# Patient Record
Sex: MALE | Race: WHITE | NOT HISPANIC OR LATINO | Employment: STUDENT | URBAN - METROPOLITAN AREA
[De-identification: names, ages, dates, MRNs, and addresses within clinical notes are randomized per-mention and may not be internally consistent; named-entity substitution may affect disease eponyms.]

---
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
## 2023-01-25 ENCOUNTER — OFFICE VISIT (OUTPATIENT)
Dept: FAMILY MEDICINE CLINIC | Facility: CLINIC | Age: 18
End: 2023-01-25

## 2023-01-25 VITALS
TEMPERATURE: 97.7 F | WEIGHT: 126.6 LBS | OXYGEN SATURATION: 99 % | DIASTOLIC BLOOD PRESSURE: 60 MMHG | SYSTOLIC BLOOD PRESSURE: 90 MMHG | HEIGHT: 69 IN | BODY MASS INDEX: 18.75 KG/M2 | RESPIRATION RATE: 99 BRPM | HEART RATE: 67 BPM

## 2023-01-25 DIAGNOSIS — Z23 IMMUNIZATION DUE: ICD-10-CM

## 2023-01-25 DIAGNOSIS — Z00.00 ANNUAL PHYSICAL EXAM: Primary | ICD-10-CM

## 2023-01-25 DIAGNOSIS — R63.4 WEIGHT LOSS: ICD-10-CM

## 2023-01-25 NOTE — PATIENT INSTRUCTIONS

## 2023-01-25 NOTE — PROGRESS NOTES
[address]    NAME: Vito Ramos  AGE: 21 y o  SEX: male  : 2005     DATE: 2023     Assessment and Plan:     Problem List Items Addressed This Visit    None  Visit Diagnoses     Annual physical exam    -  Primary    Vito appears well  He is to continue a healthy diet, and remain active  Weight loss        Weight stable - acutally some up from last year  Normal TSH, labs, in 10/2021  Pt has continued to eat , and stay active  Immunization due        Pt with mild recent viral URI, no fevers, and resolving -> ok to proceed with Flu Vaccine  Flu Vaccine given IM today, and tolerated well  Relevant Orders    influenza vaccine, quadrivalent, 0 5 mL, preservative-free, for adult and pediatric patients 6 mos+ (AFLURIA, FLUARIX, FLULAVAL, FLUZONE) (Completed)          Immunizations and preventive care screenings were discussed with patient today  Appropriate education was printed on patient's after visit summary  Counseling:  Dental Health: discussed importance of regular tooth brushing, flossing, and dental visits  · Exercise: the importance of regular exercise/physical activity was discussed  Recommend exercise 3-5 times per week for at least 30 minutes  Depression Screening and Follow-up Plan: Patient was screened for depression during today's encounter  They screened negative with a PHQ-2 score of 0  Return in 1 year (on 2024) for Annual physical      Chief Complaint:     Chief Complaint   Patient presents with   • Annual Exam     Patient here for annual wellness exam       History of Present Illness:     Adult Annual Physical   Patient here for a comprehensive physical exam  The patient reports no problems  Luis Brock is feeling well  He did receive his GED last year - not currently working; is considering what he wants to do with his career path    Is considering starting some college courses  Had a minor URI recently, which is resolving - reports testing at home COV-19 negative  He is vaccinated against COV-19  He presents today with his [place] Mother, Shruthi Falling  Diet and Physical Activity  · Diet/Nutrition: well balanced diet  · Exercise: walking  Depression Screening  PHQ-2/9 Depression Screening    Little interest or pleasure in doing things: 0 - not at all  Feeling down, depressed, or hopeless: 0 - not at all  PHQ-2 Score: 0  PHQ-2 Interpretation: Negative depression screen       General Health  · Sleep: sleeps well  · Hearing: normal - bilateral   · Vision: no vision problems  Wears glasses  · Dental: regular dental visits  [facility]  · History of STDs?: no      Review of Systems:     Review of Systems   Constitutional: Negative for activity change and fever  HENT: Positive for congestion  Respiratory: Negative for shortness of breath  Cardiovascular: Negative for chest pain  Gastrointestinal: Negative for abdominal pain  Psychiatric/Behavioral: Negative for dysphoric mood  The patient is not nervous/anxious         Past Medical History:     Past Medical History:   Diagnosis Date   • Asthma    • Candidiasis, cutaneous     last assessed 7/13/2015   • Cerebral palsy ([facility] [address] )    • Hematuria     last assessed 8/17/2012   • Hordeolum externum     last assessed 10/22/2012      Past Surgical History:     Past Surgical History:   Procedure Laterality Date   • HYPOSPADIAS CORRECTION      one stage proximal penile hypospadias repair   • TONSILLECTOMY AND ADENOIDECTOMY        Social History:     Social History     Socioeconomic History   • Marital status: Single     Spouse name: None   • Number of children: None   • Years of education: None   • Highest education level: None   Occupational History   • None   Tobacco Use   • Smoking status: Never   • Smokeless tobacco: Never   Vaping Use   • Vaping Use: Never used   Substance and Sexual Activity   • Alcohol use: No   • Drug use: No   • Sexual activity: None   Other Topics Concern   • None   Social History Narrative   • None     Social Determinants of Health     Financial Resource Strain: Not on file   Food Insecurity: Not on file   Transportation Needs: Not on file   Physical Activity: Not on file   Stress: Not on file   Social Connections: Not on file   Intimate Partner Violence: Not on file   Housing Stability: Not on file      Family History:     Family History   Problem Relation Age of Onset   • Hypertension Father       Current Medications:     Current Outpatient Medications   Medication Sig Dispense Refill   • benzonatate (TESSALON PERLES) 100 mg capsule Take 1 capsule (100 mg total) by mouth 3 (three) times a day as needed for cough (Patient not taking: Reported on 1/25/2023) 20 capsule 0   • fluticasone (FLONASE) 50 mcg/act nasal spray 1 spray into each nostril daily (Patient not taking: Reported on 1/25/2023) 9 9 mL 1     No current facility-administered medications for this visit  Allergies: Allergies   Allergen Reactions   • Pollen Extract       Physical Exam:     BP 90/60 (BP Location: Left arm, Patient Position: Sitting, Cuff Size: Standard)   Pulse 67   Temp 97 7 °F (36 5 °C) (Tympanic)   Resp (!) 99   Ht 5' 8 9" (1 75 m)   Wt 57 4 kg (126 lb 9 6 oz)   SpO2 99%   BMI 18 75 kg/m²     Physical Exam  Vitals and nursing note reviewed  Constitutional:       General: He is not in acute distress  Appearance: Normal appearance  He is not ill-appearing, toxic-appearing or diaphoretic  HENT:      Head: Normocephalic and atraumatic  Right Ear: Tympanic membrane, ear canal and external ear normal       Left Ear: Tympanic membrane, ear canal and external ear normal    Eyes:      General: No scleral icterus  Conjunctiva/sclera: Conjunctivae normal    Cardiovascular:      Rate and Rhythm: Normal rate and regular rhythm  Heart sounds: Normal heart sounds  No murmur heard  No friction rub   No gallop  Pulmonary:      Effort: Pulmonary effort is normal  No respiratory distress  Breath sounds: Normal breath sounds  No stridor  No wheezing, rhonchi or rales  Abdominal:      General: Abdomen is flat  Bowel sounds are normal  There is no distension  Palpations: Abdomen is soft  There is no mass  Tenderness: There is no abdominal tenderness  There is no guarding or rebound  Musculoskeletal:      Cervical back: Normal range of motion and neck supple  No rigidity or tenderness  Lymphadenopathy:      Cervical: No cervical adenopathy  Neurological:      Mental Status: He is alert and oriented to person, place, and time  Psychiatric:         Mood and Affect: Mood normal          Behavior: Behavior normal          Thought Content: Thought content normal          Judgment: Judgment normal           Vito was seen today for annual exam     Diagnoses and all orders for this visit:    Annual physical exam  Comments:  Luke Else appears well  He is to continue a healthy diet, and remain active  Weight loss  Comments:  Weight stable - acutally some up from last year  Normal TSH, labs, in 10/2021  Pt has continued to eat , and stay active  Immunization due  Comments:  Pt with mild recent viral URI, no fevers, and resolving -> ok to proceed with Flu Vaccine  Flu Vaccine given IM today, and tolerated well    Orders:  -     influenza vaccine, quadrivalent, 0 5 mL, preservative-free, for adult and pediatric patients 6 mos+ (AFLURIA, [address], FLULAVAL, FLUZONE)          Omi Jaimes DO   [id number] [facility]

## 2023-08-11 ENCOUNTER — HOSPITAL ENCOUNTER (EMERGENCY)
Facility: HOSPITAL | Age: 18
Discharge: HOME | End: 2023-08-11
Attending: EMERGENCY MEDICINE
Payer: COMMERCIAL

## 2023-08-11 VITALS
HEART RATE: 73 BPM | SYSTOLIC BLOOD PRESSURE: 118 MMHG | BODY MASS INDEX: 19.49 KG/M2 | DIASTOLIC BLOOD PRESSURE: 64 MMHG | OXYGEN SATURATION: 100 % | WEIGHT: 131.61 LBS | TEMPERATURE: 97.5 F | HEIGHT: 69 IN | RESPIRATION RATE: 18 BRPM

## 2023-08-11 DIAGNOSIS — N30.91 HEMORRHAGIC CYSTITIS: Primary | ICD-10-CM

## 2023-08-11 LAB
BACTERIA UR QL AUTO: ABNORMAL /HPF
BILIRUB UR QL STRIP: ABNORMAL
CLARITY UR: ABNORMAL
COLOR UR: ABNORMAL
GLUCOSE UR STRIP-MCNC: NEGATIVE MG/DL
HGB UR QL STRIP.AUTO: ABNORMAL
KETONES UR STRIP-MCNC: ABNORMAL MG/DL
LEUKOCYTE ESTERASE UR QL STRIP: ABNORMAL
MUCOUS THREADS UR QL AUTO: ABNORMAL
NITRITE UR QL STRIP: POSITIVE
NON-SQ EPI CELLS URNS QL MICRO: ABNORMAL /HPF
PH UR STRIP.AUTO: 6.5 [PH]
PROT UR STRIP-MCNC: ABNORMAL MG/DL
RBC #/AREA URNS AUTO: ABNORMAL /HPF
SP GR UR STRIP.AUTO: >=1.03 (ref 1–1.03)
URINE COMMENT: ABNORMAL
UROBILINOGEN UR QL STRIP.AUTO: 2 E.U./DL
WBC #/AREA URNS AUTO: ABNORMAL /HPF

## 2023-08-11 PROCEDURE — 81001 URINALYSIS AUTO W/SCOPE: CPT | Performed by: EMERGENCY MEDICINE

## 2023-08-11 PROCEDURE — 99284 EMERGENCY DEPT VISIT MOD MDM: CPT | Performed by: EMERGENCY MEDICINE

## 2023-08-11 PROCEDURE — 99283 EMERGENCY DEPT VISIT LOW MDM: CPT

## 2023-08-11 PROCEDURE — 87086 URINE CULTURE/COLONY COUNT: CPT | Performed by: EMERGENCY MEDICINE

## 2023-08-11 RX ORDER — CEPHALEXIN 250 MG/1
500 CAPSULE ORAL ONCE
Status: COMPLETED | OUTPATIENT
Start: 2023-08-11 | End: 2023-08-11

## 2023-08-11 RX ORDER — CEPHALEXIN 500 MG/1
500 CAPSULE ORAL EVERY 12 HOURS SCHEDULED
Qty: 14 CAPSULE | Refills: 0 | Status: SHIPPED | OUTPATIENT
Start: 2023-08-11 | End: 2023-08-12 | Stop reason: SDUPTHER

## 2023-08-11 RX ADMIN — CEPHALEXIN 500 MG: 250 CAPSULE ORAL at 18:33

## 2023-08-11 NOTE — ED PROVIDER NOTES
History  Chief Complaint   Patient presents with   • Blood in Urine     Reports hematuria started 20 minutes ago     59-year-old male with history of hypospadias status post surgical correction with history of UTIs presents to the emergency department for evaluation of blood in his urine. The patient reports that approximately 20 minutes prior to arrival he noticed that there was blood in his urine. The patient reports that he has associated urinary urgency. He reports prior history of urinary tract infection as a child but states that it has been several years. He denies fevers, chills, nausea, vomiting, diarrhea, dysuria, abdominal pain and penile or testicular pain/swelling. Prior to Admission Medications   Prescriptions Last Dose Informant Patient Reported? Taking?   benzonatate (TESSALON PERLES) 100 mg capsule   No No   Sig: Take 1 capsule (100 mg total) by mouth 3 (three) times a day as needed for cough   Patient not taking: Reported on 2023   fluticasone (FLONASE) 50 mcg/act nasal spray   No No   Si spray into each nostril daily   Patient not taking: Reported on 2023      Facility-Administered Medications: None       Past Medical History:   Diagnosis Date   • Asthma    • Candidiasis, cutaneous     last assessed 2015   • Cerebral palsy ([address])    • Hematuria     last assessed 2012   • Hordeolum externum     last assessed 10/22/2012       Past Surgical History:   Procedure Laterality Date   • HYPOSPADIAS CORRECTION      one stage proximal penile hypospadias repair   • TONSILLECTOMY AND ADENOIDECTOMY         Family History   Problem Relation Age of Onset   • Hypertension Father      I have reviewed and agree with the history as documented.     E-Cigarette/Vaping   • E-Cigarette Use Never User      E-Cigarette/Vaping Substances   • Nicotine No    • THC No    • CBD No    • Flavoring No    • Other No    • Unknown No      Social History     Tobacco Use   • Smoking status: Never   • Smokeless tobacco: Never   Vaping Use   • Vaping Use: Never used   Substance Use Topics   • Alcohol use: No   • Drug use: No       Review of Systems   Constitutional: Negative for chills and fever. HENT: Negative for ear pain and sore throat. Eyes: Negative for pain and visual disturbance. Respiratory: Negative for cough and shortness of breath. Cardiovascular: Negative for chest pain and palpitations. Gastrointestinal: Negative for abdominal pain and vomiting. Genitourinary: Positive for hematuria and urgency. Negative for dysuria. Musculoskeletal: Negative for arthralgias and back pain. Skin: Negative for color change and rash. Neurological: Negative for seizures and syncope. All other systems reviewed and are negative. Physical Exam  Physical Exam  Vitals and nursing note reviewed. Constitutional:       General: He is not in acute distress. Appearance: He is well-developed. HENT:      Head: Normocephalic and atraumatic. Eyes:      Conjunctiva/sclera: Conjunctivae normal.   Cardiovascular:      Rate and Rhythm: Normal rate and regular rhythm. Heart sounds: No murmur heard. Pulmonary:      Effort: Pulmonary effort is normal. No respiratory distress. Breath sounds: Normal breath sounds. Abdominal:      Palpations: Abdomen is soft. Tenderness: There is no abdominal tenderness. Musculoskeletal:         General: No swelling. Cervical back: Neck supple. Skin:     General: Skin is warm and dry. Capillary Refill: Capillary refill takes less than 2 seconds. Neurological:      Mental Status: He is alert.    Psychiatric:         Mood and Affect: Mood normal.         Vital Signs  ED Triage Vitals [08/11/23 1708]   Temperature Pulse Respirations Blood Pressure SpO2   97.5 °F (36.4 °C) 73 18 118/64 100 %      Temp Source Heart Rate Source Patient Position - Orthostatic VS BP Location FiO2 (%)   Oral Monitor Sitting Left arm --      Pain Score       No Pain Vitals:    08/11/23 1708   BP: 118/64   Pulse: 73   Patient Position - Orthostatic VS: Sitting         Visual Acuity      ED Medications  Medications   cephalexin (KEFLEX) capsule 500 mg (500 mg Oral Given 8/11/23 1833)       Diagnostic Studies  Results Reviewed     Procedure Component Value Units Date/Time    Urine Microscopic [[id number]]  (Abnormal) Collected: 08/11/23 1724    Lab Status: Final result Specimen: Urine, Clean Catch Updated: 08/11/23 1735     RBC, UA Innumerable /hpf      WBC, UA 20-30 /hpf      Epithelial Cells Occasional /hpf      Bacteria, UA Moderate /hpf      MUCUS THREADS Occasional     URINE COMMENT Microscopic performed on unspun urine due to low volume specimen. Urine culture [[id number]] Collected: 08/11/23 1724    Lab Status: In process Specimen: Urine, Clean Catch Updated: 08/11/23 1735    UA w Reflex to Microscopic w Reflex to Culture [[id number]]  (Abnormal) Collected: 08/11/23 1724    Lab Status: Final result Specimen: Urine, Clean Catch Updated: 08/11/23 1730     Color, UA Brown     Clarity, UA Cloudy     Specific Gravity, UA >=1.030     pH, UA 6.5     Leukocytes, UA 2+     Nitrite, UA Positive     Protein, UA 3+ mg/dl      Glucose, UA Negative mg/dl      Ketones, UA Trace mg/dl      Urobilinogen, UA 2.0 E.U./dl      Bilirubin, UA 1+     Occult Blood, UA 3+                 No orders to display              Procedures  Procedures         ED Course                                             Medical Decision Making  25-year-old male presented to the emergency department for evaluation of blood in his urine. On arrival the patient was awake, alert, oriented and in no acute distress. Initial vital signs were within normal limits. The patient was afebrile. Physical exam was unremarkable including a benign abdominal examination. Urinalysis was consistent with a urinary tract infection. All diagnostic studies were discussed with the patient in detail.   The patient was provided with a dose of Keflex here in the emergency department and given a prescription for a 7-day course. Recommendation was made for the patient to follow-up with his PCP and with urology. Return precautions were discussed. Patient agrees with the plan for discharge and feels comfortable to go home with proper f/u. Advised to return for worsening or additional problems. Diagnostic tests were reviewed and questions answered. Diagnosis, care plan and treatment options were discussed. The patient understands instructions and will follow up as directed. Hemorrhagic cystitis: acute illness or injury  Amount and/or Complexity of Data Reviewed  Labs: ordered. Risk  Prescription drug management. Disposition  Final diagnoses:   Hemorrhagic cystitis     Time reflects when diagnosis was documented in both MDM as applicable and the Disposition within this note     Time User Action Codes Description Comment    8/11/2023  6:10 PM Em Trinh [N30.91] Hemorrhagic cystitis       ED Disposition     ED Disposition   Discharge    Condition   Stable    Date/Time   Fri Aug 11, 2023  6:10 PM    Comment   Vito Stump discharge to home/self care.                Follow-up Information     Follow up With Specialties Details Why Contact Info Additional Information    Destiney Medina DO Family Medicine Schedule an appointment as soon as possible for a visit   [address] [place] [address] [phone]       [address] [facility] Emergency Medicine Go to  If symptoms worsen [address] [phone]  [facility] Emergency Department, [address], [address] [facility]    [facility] [place] Urology Schedule an appointment as soon as possible for a visit   [address] [facility] [address] [facility] [id number]  [phone] [facility] [place] ([place]), [address] [place] ([place]), [place], [address]          Discharge Medication List as of 8/11/2023  6:12 PM      START taking these medications    Details   cephalexin (KEFLEX) 500 mg capsule Take 1 capsule (500 mg total) by mouth every 12 (twelve) hours for 7 days, Starting Fri 8/11/2023, Until Fri 8/18/2023, Normal         CONTINUE these medications which have NOT CHANGED    Details   benzonatate (TESSALON PERLES) 100 mg capsule Take 1 capsule (100 mg total) by mouth 3 (three) times a day as needed for cough, Starting Mon 5/9/2022, Normal      fluticasone (FLONASE) 50 mcg/act nasal spray 1 spray into each nostril daily, Starting Mon 5/9/2022, Normal             No discharge procedures on file.     PDMP Review     None          ED Provider  Electronically Signed by           Caryle Rack, MD  08/12/23 4226

## 2023-08-12 RX ORDER — CEPHALEXIN 500 MG/1
500 CAPSULE ORAL EVERY 12 HOURS SCHEDULED
Qty: 14 CAPSULE | Refills: 0 | Status: SHIPPED | OUTPATIENT
Start: 2023-08-12 | End: 2023-08-19

## 2023-08-13 LAB — BACTERIA UR CULT: NORMAL

## 2023-08-16 ENCOUNTER — OFFICE VISIT (OUTPATIENT)
Dept: FAMILY MEDICINE CLINIC | Facility: CLINIC | Age: 18
End: 2023-08-16
Payer: COMMERCIAL

## 2023-08-16 VITALS
TEMPERATURE: 98.2 F | RESPIRATION RATE: 16 BRPM | DIASTOLIC BLOOD PRESSURE: 54 MMHG | OXYGEN SATURATION: 98 % | SYSTOLIC BLOOD PRESSURE: 100 MMHG | WEIGHT: 132.2 LBS | BODY MASS INDEX: 19.52 KG/M2 | HEART RATE: 78 BPM

## 2023-08-16 DIAGNOSIS — N30.01 ACUTE CYSTITIS WITH HEMATURIA: Primary | ICD-10-CM

## 2023-08-16 PROBLEM — N39.0 URINARY TRACT INFECTION WITH HEMATURIA: Status: ACTIVE | Noted: 2023-08-16

## 2023-08-16 PROBLEM — R31.9 URINARY TRACT INFECTION WITH HEMATURIA: Status: ACTIVE | Noted: 2023-08-16

## 2023-08-16 PROCEDURE — 99213 OFFICE O/P EST LOW 20 MIN: CPT | Performed by: FAMILY MEDICINE

## 2023-08-16 NOTE — ASSESSMENT & PLAN NOTE
- Following up after ED visit due to hematuria. UA in the ED revealed +Nitrites, + Leuks, + Blood. Discharged with abx for likely UTI. Currently Resolving.   - Denies any CVA tenderness, no fevers, chills, nausea, vomiting. No pain with urination. Denies hx of STI, no recent intercourse, no trauma. - Discharged with 7 day course of Keflex 500 mg q12 hrs for 7 days. - Urine Cx resulted No growth. - Reports no continued symptoms, he believes symptoms improved almost immediately after hydration and even before starting abx. Plan:  - Complete course of Keflex. - Continue good hydration practice. - Monitor for recurrence of symptoms or changes.   - RTC or call with any worsening or return of symptom.

## 2023-08-16 NOTE — PATIENT INSTRUCTIONS
Urinary Tract Infection in Men   WHAT YOU NEED TO KNOW:   A urinary tract infection (UTI) is caused by bacteria that get inside your urinary tract. Your urinary tract includes your kidneys, ureters, bladder, and urethra. A UTI is more common in your lower urinary tract, which includes your bladder and urethra. DISCHARGE INSTRUCTIONS:   Return to the emergency department if:   You are urinating very little or not at all. You have a high fever with shaking chills. You have side or back pain that gets worse. Call your doctor if:   You have a fever. You do not feel better after 2 days of taking antibiotics. You are vomiting. You have new symptoms, such as blood or pus in your urine. You have questions or concerns about your condition or care. Medicines:   Antibiotics  treat a bacterial infection. Medicines  may be given to decrease pain and burning when you urinate. They will also help decrease the feeling that you need to urinate often. These medicines may make your urine orange or red. Take your medicine as directed. Contact your healthcare provider if you think your medicine is not helping or if you have side effects. Tell your provider if you are allergic to any medicine. Keep a list of the medicines, vitamins, and herbs you take. Include the amounts, and when and why you take them. Bring the list or the pill bottles to follow-up visits. Carry your medicine list with you in case of an emergency. Prevent another UTI:   Empty your bladder often. Urinate and empty your bladder as soon as you feel the need. Do not hold your urine for long periods of time. Drink liquids as directed. Ask how much liquid to drink each day and which liquids are best for you. You may need to drink more liquids than usual to help flush out the bacteria. Do not drink alcohol, caffeine, or citrus juices. These can irritate your bladder and increase your symptoms.  Your healthcare provider may recommend cranberry juice to help prevent a UTI. Urinate after you have sex. This can help flush out bacteria passed during sex. Do pelvic muscle exercises often. Pelvic muscle exercises may help you start and stop urinating. Strong pelvic muscles may help you empty your bladder easier. Squeeze these muscles tightly for 5 seconds like you are trying to hold back urine. Then relax for 5 seconds. Gradually work up to squeezing for 10 seconds. Do 3 sets of 15 repetitions a day, or as directed. Follow up with your doctor as directed:  Write down your questions so you remember to ask them during your visits. © Copyright [employer]ule[employer] Klein 2022 Information is for End User's use only and may not be sold, redistributed or otherwise used for commercial purposes. The above information is an  only. It is not intended as medical advice for individual conditions or treatments. Talk to your doctor, nurse or pharmacist before following any medical regimen to see if it is safe and effective for you.

## 2023-08-16 NOTE — PROGRESS NOTES
Name: Pepper Vidal      : 2005      MRN: [id number]  Encounter Provider: Ernie Cowan DO  Encounter Date: 2023   Encounter department: [facility]     1. Acute cystitis with hematuria  Assessment & Plan:  - Following up after ED visit due to hematuria. UA in the ED revealed +Nitrites, + Leuks, + Blood. Discharged with abx for likely UTI. Currently Resolving.   - Denies any CVA tenderness, no fevers, chills, nausea, vomiting. No pain with urination. Denies hx of STI, no recent intercourse, no trauma. - Discharged with 7 day course of Keflex 500 mg q12 hrs for 7 days. - Urine Cx resulted No growth. - Reports no continued symptoms, he believes symptoms improved almost immediately after hydration and even before starting abx. Plan:  - Complete course of Keflex. - Continue good hydration practice. - Monitor for recurrence of symptoms or changes.   - RTC or call with any worsening or return of symptom. Nnamdi Ivey is a 78-year-old male who presents today for follow-up after recent visit to the ED for episode of blood in his urine. Patient does have a history of hypospadias status postsurgical correction many years ago. He reports initially after correction he was prone to UTIs but has not had any for many years. In the ED patient had a UA done which showed positive nitrates, positive leuks and positive blood. He denies any fevers, chills, nausea, vomiting denies any dysuria, abdominal pain, flank pain, testicular pain or swelling. He denies any history of STIs and no recent trauma or intercourse. After leaving the ED he reports symptoms resolved before he picked up his antibiotics however next day started antibiotics as instructed. He has been taking antibiotics as prescribed without any side effects. He has been hydrating and urinating without any blood in his urine or discomfort. No other concerns at this time.     Review of Systems   Constitutional: Negative for chills and fever. HENT: Negative for ear pain and sore throat. Eyes: Negative for pain, redness and visual disturbance. Respiratory: Negative for cough and shortness of breath. Cardiovascular: Negative for chest pain and palpitations. Gastrointestinal: Negative for abdominal pain and vomiting. Genitourinary: Positive for hematuria. Negative for decreased urine volume, dysuria, penile discharge, penile pain, scrotal swelling and urgency. Musculoskeletal: Negative for arthralgias, back pain and neck pain. Skin: Negative for color change and rash. Neurological: Negative for dizziness, seizures, syncope and headaches. Psychiatric/Behavioral: Negative for confusion. The patient is not nervous/anxious. All other systems reviewed and are negative. Current Outpatient Medications on File Prior to Visit   Medication Sig   • cephalexin (KEFLEX) 500 mg capsule Take 1 capsule (500 mg total) by mouth every 12 (twelve) hours for 7 days   • benzonatate (TESSALON PERLES) 100 mg capsule Take 1 capsule (100 mg total) by mouth 3 (three) times a day as needed for cough (Patient not taking: Reported on 1/25/2023)   • fluticasone (FLONASE) 50 mcg/act nasal spray 1 spray into each nostril daily (Patient not taking: Reported on 1/25/2023)       Objective     /54   Pulse 78   Temp 98.2 °F (36.8 °C)   Resp 16   Wt 60 kg (132 lb 3.2 oz)   SpO2 98%   BMI 19.52 kg/m²     Physical Exam  Vitals and nursing note reviewed. Constitutional:       Appearance: Normal appearance. HENT:      Head: Normocephalic and atraumatic. Right Ear: External ear normal.      Left Ear: External ear normal.      Nose: Nose normal.   Eyes:      Extraocular Movements: Extraocular movements intact. Conjunctiva/sclera: Conjunctivae normal.   Abdominal:      General: Bowel sounds are normal.      Palpations: Abdomen is soft.    Musculoskeletal:      Cervical back: Normal range of motion. Right lower leg: No edema. Left lower leg: No edema. Skin:     General: Skin is warm. Findings: No rash. Neurological:      General: No focal deficit present. Mental Status: He is alert and oriented to person, place, and time.    Psychiatric:         Mood and Affect: Mood normal.         Behavior: Behavior normal.       Iyer Karuna, DO

## 2023-09-08 ENCOUNTER — OFFICE VISIT (OUTPATIENT)
Dept: FAMILY MEDICINE CLINIC | Facility: CLINIC | Age: 18
End: 2023-09-08
Payer: COMMERCIAL

## 2023-09-08 VITALS
OXYGEN SATURATION: 99 % | DIASTOLIC BLOOD PRESSURE: 76 MMHG | TEMPERATURE: 96.3 F | RESPIRATION RATE: 14 BRPM | HEART RATE: 67 BPM | SYSTOLIC BLOOD PRESSURE: 98 MMHG | BODY MASS INDEX: 19.49 KG/M2 | WEIGHT: 132 LBS

## 2023-09-08 DIAGNOSIS — G80.8 OTHER CEREBRAL PALSY (HCC): Primary | ICD-10-CM

## 2023-09-08 DIAGNOSIS — M79.605 PAIN IN POSTERIOR LEFT LOWER EXTREMITY: ICD-10-CM

## 2023-09-08 PROCEDURE — 99213 OFFICE O/P EST LOW 20 MIN: CPT | Performed by: FAMILY MEDICINE

## 2023-09-08 NOTE — ASSESSMENT & PLAN NOTE
-Stable. Does have some feelings of tightness in his L leg as hs restarted working recently. Denies any trauma or injury. Denies any redness, streaking, warmth of his leg. No recent travel.  -On exam there is no swelling, ecchymosis or redness. No evidence of infection. Sindy Bojorquez is negative. Mild hypertonicity in the hamstrings. Plan:  - Referral to PT, patient to call for appointment.  -Home exercises and stretching discussed and provided.  -Discussed adequate hydration and sleep hygiene.  -Follow-up in 4 months or sooner as needed.

## 2023-09-08 NOTE — PROGRESS NOTES
Name: Raman Pacheco      : 2005      MRN: [id number]  Encounter Provider: Joelle Cornell DO  Encounter Date: 2023   Encounter department: [facility]     1. Other cerebral palsy ([address])  Assessment & Plan:  -Stable. Does have some feelings of tightness in his L leg as hs restarted working recently. Denies any trauma or injury. Denies any redness, streaking, warmth of his leg. No recent travel.  -On exam there is no swelling, ecchymosis or redness. No evidence of infection. Caden Brit is negative. Mild hypertonicity in the hamstrings. Plan:  - Referral to PT, patient to call for appointment.  -Home exercises and stretching discussed and provided.  -Discussed adequate hydration and sleep hygiene.  -Follow-up in 4 months or sooner as needed. Orders:  -     Ambulatory Referral to Physical Therapy; Future    2. Pain in posterior left lower extremity  -     Ambulatory Referral to Physical Therapy; Future         Subjective     Damaso Smith is a 58-year-old male with past medical history of cerebral palsy who presents today with some left leg tightness. Reports he had done physical therapy in the past which had really helped with this. He denies any injury to the leg or trauma at all. He states he has started working at [employer] again and is up on his feet a lot more. He denies any sharp pains down his leg or into his feet. He relates he only has tightness with extension. Denies any bug bites, redness, fevers, chills, nausea, vomiting, diarrhea. Denies any recent travel. No history of clots. Review of Systems   Constitutional: Negative for chills, fatigue and fever. HENT: Negative for ear pain, postnasal drip, rhinorrhea, sinus pain and sore throat. Eyes: Negative for pain and visual disturbance. Respiratory: Negative for cough and shortness of breath. Cardiovascular: Negative for chest pain and palpitations.    Gastrointestinal: Negative for abdominal pain and vomiting. Genitourinary: Negative for dysuria and hematuria. Musculoskeletal: Negative for arthralgias, back pain, neck pain and neck stiffness. Left leg tightness   Skin: Negative for color change and rash. Neurological: Negative for dizziness, seizures, syncope, weakness and headaches. Psychiatric/Behavioral: Negative for confusion and sleep disturbance. The patient is not nervous/anxious. All other systems reviewed and are negative.       Past Medical History:   Diagnosis Date   • Asthma    • Candidiasis, cutaneous     last assessed 7/13/2015   • Cerebral palsy ([address])    • Hematuria     last assessed 8/17/2012   • Hordeolum externum     last assessed 10/22/2012     Past Surgical History:   Procedure Laterality Date   • HYPOSPADIAS CORRECTION      one stage proximal penile hypospadias repair   • TONSILLECTOMY AND ADENOIDECTOMY       Family History   Problem Relation Age of Onset   • Hypertension Father      Social History     Socioeconomic History   • Marital status: Single     Spouse name: None   • Number of children: None   • Years of education: None   • Highest education level: None   Occupational History   • None   Tobacco Use   • Smoking status: Never   • Smokeless tobacco: Never   Vaping Use   • Vaping Use: Never used   Substance and Sexual Activity   • Alcohol use: No   • Drug use: No   • Sexual activity: None   Other Topics Concern   • None   Social History Narrative   • None     Social Determinants of Health     Financial Resource Strain: Not on file   Food Insecurity: Not on file   Transportation Needs: Not on file   Physical Activity: Not on file   Stress: Not on file   Social Connections: Not on file   Intimate Partner Violence: Not on file   Housing Stability: Not on file     Current Outpatient Medications on File Prior to Visit   Medication Sig   • benzonatate (TESSALON PERLES) 100 mg capsule Take 1 capsule (100 mg total) by mouth 3 (three) times a day as needed for cough (Patient not taking: Reported on 1/25/2023)   • fluticasone (FLONASE) 50 mcg/act nasal spray 1 spray into each nostril daily (Patient not taking: Reported on 1/25/2023)     Allergies   Allergen Reactions   • Pollen Extract      Immunization History   Administered Date(s) Administered   • COVID-19 PFIZER VACCINE 0.3 ML IM 06/10/2021, 07/14/2021   • DTaP 2005, 2003, 2003, 09/09/2004, 06/05/2007   • DTaP 5 2003, 2003, 2003, 09/09/2004, 06/05/2007   • H1N1, All Formulations 11/05/2009, 01/04/2010   • HPV Quadrivalent 10/13/2016   • HPV9 08/10/2016, 02/13/2017   • Hep A, adult 03/06/2009, 09/11/2009   • Hep B / HiB 2003, 2003, 06/21/2004   • Hep B, adult 2003, 2003, 06/21/2004   • Hepatitis A 03/06/2009, 09/11/2009   • Hib (PRP-OMP) 2003, 2003, 06/21/2004   • INFLUENZA 11/27/2007, 01/10/2008, 11/17/2010, 10/03/2012, 10/17/2017, 11/05/2018   • IPV 2003, 2003, 09/09/2004, 06/05/2007   • Influenza Quadrivalent Preservative Free 3 years and older IM 09/22/2014, 10/01/2016, 10/17/2017   • Influenza Quadrivalent, 6-35 Months IM 09/24/2015, 10/17/2017   • Influenza, injectable, quadrivalent, preservative free 0.5 mL 11/05/2018, 10/30/2019, 09/27/2021, 01/25/2023   • Influenza, seasonal, injectable 11/27/2007, 01/10/2008, 11/17/2010, 10/03/2012, 10/03/2012, 09/27/2013   • MMR 06/21/2004, 06/05/2007   • Meningococcal Conjugate (MCV4O) 07/11/2014   • Meningococcal MCV4P 04/02/2019   • Pneumococcal Conjugate PCV 7 2003, 2003, 2003   • Tdap 07/11/2014   • Varicella 02/23/2004, 10/08/2007       Objective     BP 98/76   Pulse 67   Temp (!) 96.3 °F (35.7 °C)   Resp 14   Wt 59.9 kg (132 lb)   SpO2 99%   BMI 19.49 kg/m²     Physical Exam  Vitals and nursing note reviewed. Constitutional:       General: He is not in acute distress. Appearance: Normal appearance. He is not ill-appearing.    HENT:      Head: Normocephalic and atraumatic. Right Ear: Tympanic membrane and external ear normal.      Left Ear: Tympanic membrane normal.      Nose: Nose normal. No congestion. Mouth/Throat:      Mouth: Mucous membranes are moist.      Pharynx: No oropharyngeal exudate. Eyes:      Extraocular Movements: Extraocular movements intact. Conjunctiva/sclera: Conjunctivae normal.      Pupils: Pupils are equal, round, and reactive to light. Cardiovascular:      Rate and Rhythm: Normal rate and regular rhythm. Pulses: Normal pulses. Heart sounds: Normal heart sounds. No murmur heard. Pulmonary:      Effort: Pulmonary effort is normal.      Breath sounds: Normal breath sounds. No wheezing, rhonchi or rales. Abdominal:      General: Bowel sounds are normal.      Palpations: Abdomen is soft. Tenderness: There is no abdominal tenderness. There is no guarding or rebound. Musculoskeletal:         General: Normal range of motion. Cervical back: Normal range of motion. Right lower leg: No edema. Left lower leg: No edema. Comments: Left leg -full range of motion in flexion extension at the knee. Mild hypertonicity in his hamstrings. No erythema, cool to touch, nontender, no evidence of infection. Loletha Rick' sign negative. Lymphadenopathy:      Cervical: No cervical adenopathy. Skin:     General: Skin is warm. Capillary Refill: Capillary refill takes less than 2 seconds. Findings: No erythema. Neurological:      General: No focal deficit present. Mental Status: He is alert and oriented to person, place, and time. Cranial Nerves: No cranial nerve deficit. Motor: No weakness.    Psychiatric:         Mood and Affect: Mood normal.         Behavior: Behavior normal.       Nubia Shultz DO

## 2023-09-18 ENCOUNTER — EVALUATION (OUTPATIENT)
Dept: PHYSICAL THERAPY | Facility: REHABILITATION | Age: 18
End: 2023-09-18
Payer: COMMERCIAL

## 2023-09-18 DIAGNOSIS — M79.605 PAIN IN POSTERIOR LEFT LOWER EXTREMITY: ICD-10-CM

## 2023-09-18 DIAGNOSIS — G80.8 OTHER CEREBRAL PALSY (HCC): Primary | ICD-10-CM

## 2023-09-18 PROCEDURE — 97162 PT EVAL MOD COMPLEX 30 MIN: CPT

## 2023-09-18 PROCEDURE — 97110 THERAPEUTIC EXERCISES: CPT

## 2023-09-18 NOTE — PROGRESS NOTES
PT Evaluation     Today's date: 2023  Patient name: Dawn Raymond  : 2005  MRN: [id number]  Referring provider: Greg Andrade DO  Dx:   Encounter Diagnosis     ICD-10-CM    1. Pain in posterior left lower extremity  M79.605 Ambulatory Referral to Physical Therapy      2. Other cerebral palsy ([address])  G80.8 Ambulatory Referral to Physical Therapy                     Assessment  Assessment details: Patient presents to PT with B LE tightness and pain. Patient displays decreased calf tightness and hamstring tightness. These impairments are leading to pain with walking and standing tasks. Patient will benefit from skilled PT to address above impairments and help them return to PLOF. Impairments: abnormal coordination, abnormal gait, abnormal muscle firing, abnormal or restricted ROM, abnormal movement, activity intolerance, impaired balance, impaired physical strength, lacks appropriate home exercise program, pain with function and weight-bearing intolerance  Barriers to therapy: CP  Understanding of Dx/Px/POC: good   Prognosis: good    Goals  STG  1. Patient will display decreased pain to 0-2 in 6 weeks  2. Patient will display ankle ROM WNL in 6 weeks  3. Patient will display ankle strength WNL in 6 weeks    LTG  1. Patient will be able to stand for 30 minutes without pain in 12 weeks  2. Patient will be able to walk for 30 minutes without pain in 12 weeks  3.  Patient will be able to go up/down 3 flights of stairs without pain in 12 weeks    Plan  Patient would benefit from: PT eval and skilled physical therapy  Referral necessary: Yes  Planned modality interventions: TENS, cryotherapy, thermotherapy: hydrocollator packs and traction  Planned therapy interventions: manual therapy, joint mobilization, massage, motor coordination training, neuromuscular re-education, patient education, strengthening, stretching, therapeutic activities, therapeutic exercise, therapeutic training, home exercise program, graded exercise, graded activity, gait training, functional ROM exercises, flexibility, fine motor coordination training, balance/weight bearing training, balance, ADL training and activity modification  Frequency: 2x week  Duration in visits: 12  Duration in weeks: 6  Plan of Care beginning date: 2023  Plan of Care expiration date: 10/30/2023  Treatment plan discussed with: patient        Subjective Evaluation    History of Present Illness  Mechanism of injury: Patient states he has also had tightness in his legs ever since he was a kid. He was diagnosed with CP but he is very functional. Patient is now getting a lot of tightness and some pain down both legs now that he is on his feet more at work. Patient states he used to have orthotics but they are won out.  Patient denies N&T in legs          Not a recurrent problem   Quality of life: good    Patient Goals  Patient goals for therapy: decreased pain, increased motion and increased strength    Pain  Current pain ratin  At best pain ratin  At worst pain ratin  Quality: tight  Aggravating factors: standing, walking and stair climbing    Social Support  Steps to enter house: yes    Employment status: working        Objective     Active Range of Motion   Left Knee   Extension: 2 degrees     Right Knee   Extension: 2 degrees   Left Ankle/Foot   Dorsiflexion (ke): 0 degrees     Right Ankle/Foot   Dorsiflexion (ke): 0 degrees     Strength/Myotome Testing     Left Hip   Planes of Motion   Extension: 3+  Abduction: 3+    Right Hip   Planes of Motion   Extension: 3+  Abduction: 3+    General Comments:      Hip Comments   Decreased hamstring length B    Ankle/Foot Comments   B calf tightness noted             Precautions: CP as child      Manuals             stretching                                                    Neuro Re-Ed             balance                                                                                           Ther Ex Calf s             Ham s             SKTC                                                                              Ther Activity             Heel raises                          Gait Training                                       Modalities

## 2023-09-20 ENCOUNTER — OFFICE VISIT (OUTPATIENT)
Dept: PHYSICAL THERAPY | Facility: REHABILITATION | Age: 18
End: 2023-09-20
Payer: COMMERCIAL

## 2023-09-20 DIAGNOSIS — M79.605 PAIN IN POSTERIOR LEFT LOWER EXTREMITY: Primary | ICD-10-CM

## 2023-09-20 DIAGNOSIS — G80.8 OTHER CEREBRAL PALSY (HCC): ICD-10-CM

## 2023-09-20 PROCEDURE — 97110 THERAPEUTIC EXERCISES: CPT

## 2023-09-20 PROCEDURE — 97112 NEUROMUSCULAR REEDUCATION: CPT

## 2023-09-20 PROCEDURE — 97530 THERAPEUTIC ACTIVITIES: CPT

## 2023-09-20 NOTE — PROGRESS NOTES
Daily Note     Today's date: 2023  Patient name: Sean Alvares  : 2005  MRN: [id number]  Referring provider: Lindsey Yo DO  Dx:   Encounter Diagnosis     ICD-10-CM    1. Pain in posterior left lower extremity  M79.605       2. Other cerebral palsy (HCC)  G80.8                      Subjective: patient states the exercises have been going well so far      Objective: See treatment diary below      Assessment: Tolerated treatment well. Patient demonstrated fatigue post treatment, exhibited good technique with therapeutic exercises and would benefit from continued PT Patient continues to have hamstring and calf tightness B. Patient with good tolerance to initiation of stretching/strengthening      Plan: Continue per plan of care.       Precautions: CP as child      Manuals             stretching                                                    Neuro Re-Ed             balance             bridges 3x10                                                                             Ther Ex             Calf s 3x20s            Ham s 3x20s            SKTC             Piriformis s 3x20s            Prone ham curl 2# 3x10                                                   Ther Activity             Heel raises Off step 3x10                         Gait Training                                       Modalities

## 2023-09-29 ENCOUNTER — OFFICE VISIT (OUTPATIENT)
Dept: PHYSICAL THERAPY | Facility: REHABILITATION | Age: 18
End: 2023-09-29
Payer: COMMERCIAL

## 2023-09-29 DIAGNOSIS — M79.605 PAIN IN POSTERIOR LEFT LOWER EXTREMITY: Primary | ICD-10-CM

## 2023-09-29 DIAGNOSIS — G80.8 OTHER CEREBRAL PALSY (HCC): ICD-10-CM

## 2023-09-29 PROCEDURE — 97112 NEUROMUSCULAR REEDUCATION: CPT

## 2023-09-29 PROCEDURE — 97140 MANUAL THERAPY 1/> REGIONS: CPT

## 2023-09-29 PROCEDURE — 97110 THERAPEUTIC EXERCISES: CPT

## 2023-09-29 NOTE — PROGRESS NOTES
Daily Note     Today's date: 2023  Patient name: Mikayla Brown  : 2005  MRN: [id number]  Referring provider: Lynn Melendrez DO  Dx:   Encounter Diagnosis     ICD-10-CM    1. Pain in posterior left lower extremity  M79.605       2. Other cerebral palsy (HCC)  G80.8                      Subjective: patient states he is doing overall well. He reports that he had some lower back pain since last visit. Objective: See treatment diary below      Assessment: Tolerated treatment well. Initiated POC on RB for active warmup. Resumed with prescribed POC, with focus on gentle stretching. VCs provided for correct technique with TE. Good response to manuals, he does present with tightness into liam LE's, encouraged to continue with daily stretching. Patient demonstrated fatigue post treatment, exhibited good technique with therapeutic exercises and would benefit from continued PT       Plan: Continue per plan of care.       Precautions: CP as child      Manuals             stretching             IASTM gastroc/hamstring  RA 8'                                      Neuro Re-Ed             balance             bridges 3x10 3x10                                                                             Ther Ex             Calf s 3x20s 3x20s            Ham s 3x20s 3x20s            SKTC             Piriformis s 3x20s 3x20's            Prone ham curl 2# 3x10 2# 3x10            RB  5 min                                      Ther Activity             Heel raises Off step 3x10 Off step   3x10                        Gait Training                                       Modalities

## 2023-10-15 PROBLEM — R31.9 URINARY TRACT INFECTION WITH HEMATURIA: Status: RESOLVED | Noted: 2023-08-16 | Resolved: 2023-10-15

## 2023-10-15 PROBLEM — N39.0 URINARY TRACT INFECTION WITH HEMATURIA: Status: RESOLVED | Noted: 2023-08-16 | Resolved: 2023-10-15

## 2023-10-25 ENCOUNTER — OFFICE VISIT (OUTPATIENT)
Dept: PHYSICAL THERAPY | Facility: REHABILITATION | Age: 18
End: 2023-10-25
Payer: COMMERCIAL

## 2023-10-25 DIAGNOSIS — G80.8 OTHER CEREBRAL PALSY (HCC): ICD-10-CM

## 2023-10-25 DIAGNOSIS — M79.605 PAIN IN POSTERIOR LEFT LOWER EXTREMITY: Primary | ICD-10-CM

## 2023-10-25 PROCEDURE — 97112 NEUROMUSCULAR REEDUCATION: CPT

## 2023-10-25 PROCEDURE — 97140 MANUAL THERAPY 1/> REGIONS: CPT

## 2023-10-25 PROCEDURE — 97110 THERAPEUTIC EXERCISES: CPT

## 2023-10-25 NOTE — PROGRESS NOTES
Daily Note     Today's date: 10/25/2023  Patient name: Hyacinth Long  : 2005  MRN: [id number]  Referring provider: Alla Serna DO  Dx:   Encounter Diagnosis     ICD-10-CM    1. Pain in posterior left lower extremity  M79.605       2. Other cerebral palsy ([address])  G80.8           Start Time: 5646  Stop Time: 3318  Total time in clinic (min): 40 minutes    Subjective: Pt reports no medical changes since his last session. He notes bilateral LE tightness this morning. Objective: See treatment diary below      Assessment: Tolerated treatment well. He is giving cues on proper reps/sets throughout session. He is additionally given reminders on proper form for stretches to ensure optimal position to obtain comfortable stretch. Patient demonstrated fatigue post treatment, exhibited good technique with therapeutic exercises, and would benefit from continued PT PT discussed continuing HEP at home consistently to ensure proper carry over between days. Plan: Continue per plan of care.       Precautions: CP as child      Manuals  9/29 10/25          stretching             IASTM gastroc/hamstring  RA 8'  FH 8'                                    Neuro Re-Ed             balance             bridges 3x10 3x10  3x10                                                                           Ther Ex             Calf s 3x20s 3x20s  3x30s          Ham s 3x20s 3x20s  3x30s          SKTC             Piriformis s 3x20s 3x20's  3x30s          Prone ham curl 2# 3x10 2# 3x10  NV          RB  5 min  5 min                                    Ther Activity             Heel raises Off step 3x10 Off step   3x10 NV                       Gait Training                                       Modalities

## 2023-10-27 ENCOUNTER — OFFICE VISIT (OUTPATIENT)
Dept: PHYSICAL THERAPY | Facility: REHABILITATION | Age: 18
End: 2023-10-27
Payer: COMMERCIAL

## 2023-10-27 DIAGNOSIS — G80.8 OTHER CEREBRAL PALSY (HCC): ICD-10-CM

## 2023-10-27 DIAGNOSIS — M79.605 PAIN IN POSTERIOR LEFT LOWER EXTREMITY: Primary | ICD-10-CM

## 2023-10-27 PROCEDURE — 97112 NEUROMUSCULAR REEDUCATION: CPT | Performed by: PHYSICAL THERAPIST

## 2023-10-27 PROCEDURE — 97110 THERAPEUTIC EXERCISES: CPT | Performed by: PHYSICAL THERAPIST

## 2023-10-27 PROCEDURE — 97140 MANUAL THERAPY 1/> REGIONS: CPT | Performed by: PHYSICAL THERAPIST

## 2023-10-27 NOTE — PROGRESS NOTES
Daily Note     Today's date: 10/27/2023  Patient name: Daniela Espinosa  : 2005  MRN: [id number]  Referring provider: Justen Urbina DO  Dx:   Encounter Diagnosis     ICD-10-CM    1. Pain in posterior left lower extremity  M79.605       2. Other cerebral palsy ([address])  G80.8           Start Time: 0815  Stop Time: 09  Total time in clinic (min): 47 minutes    Subjective: No new complaints at start of session. Objective: See treatment diary below      Assessment: Tolerated treatment well. Verbal and visual cues provided for correct technique with TE. Good response to manuals, he does present with tightness into liam LE's, encouraged to continue with daily stretching. Patient demonstrated fatigue post treatment, exhibited good technique with therapeutic exercises, and would benefit from continued PT. Plan: Continue per plan of care.       Precautions: CP as child      Manuals  9/29 10/25 10/27         stretching             IASTM gastroc/hamstring  RA 8'  FH 8' AD 10'                                   Neuro Re-Ed             balance             bridges 3x10 3x10  3x10 3x10                                                                          Ther Ex             Calf s 3x20s 3x20s  3x30s 3x30" b/l 1/2 foam         [address]    10x b/l 1/2 foam         Ham s 3x20s 3x20s  3x30s 3x30" b/l         SKTC             Piriformis s 3x20s 3x20's  3x30s [id number]" b/l         Prone ham curl 2# 3x10 2# 3x10  NV          RB  5 min  5 min 5 min                                   Ther Activity             Heel raises Off step 3x10 Off step   3x10 NV Off step 3x10                      Gait Training                                       Modalities

## 2023-11-01 ENCOUNTER — OFFICE VISIT (OUTPATIENT)
Dept: PHYSICAL THERAPY | Facility: REHABILITATION | Age: 18
End: 2023-11-01
Payer: COMMERCIAL

## 2023-11-01 DIAGNOSIS — G80.8 OTHER CEREBRAL PALSY (HCC): ICD-10-CM

## 2023-11-01 DIAGNOSIS — M79.605 PAIN IN POSTERIOR LEFT LOWER EXTREMITY: Primary | ICD-10-CM

## 2023-11-01 PROCEDURE — 97112 NEUROMUSCULAR REEDUCATION: CPT | Performed by: PHYSICAL THERAPIST

## 2023-11-01 PROCEDURE — 97110 THERAPEUTIC EXERCISES: CPT | Performed by: PHYSICAL THERAPIST

## 2023-11-01 PROCEDURE — 97140 MANUAL THERAPY 1/> REGIONS: CPT | Performed by: PHYSICAL THERAPIST

## 2023-11-01 NOTE — PROGRESS NOTES
Daily Note     Today's date: 2023  Patient name: Say Matta  : 2005  MRN: [id number]  Referring provider: Bill Forman DO  Dx:   Encounter Diagnosis     ICD-10-CM    1. Pain in posterior left lower extremity  M79.605       2. Other cerebral palsy ([address])  G80.8           Start Time: 8383  Stop Time: 0940  Total time in clinic (min): 54 minutes    Subjective: No new complaints at start of session. Patient reports his calves get tight after standing and walking a lot at work. Objective: See treatment diary below      Assessment: Tolerated treatment well. Provided patient with updated HEP. No complaints of pain throughout session but patient does report tightness in calf and hamstring with stretches. Patient exhibited good technique with therapeutic exercises and would benefit from continued PT. Plan: Continue per plan of care. Progress treatment as tolerated.        Precautions: CP as child      Manuals  9/29 10/25 10/27 11        stretching             IASTM gastroc/hamstring  RA 8'  FH 8' AD 10' AD 10'                                  Neuro Re-Ed             balance             bridges 3x10 3x10  3x10 3x10 15# 3x12                                                                         Ther Ex             Calf s 3x20s 3x20s  3x30s 3x30" b/l 1/2 foam 3x30" b/l 1/2 foam        [address]    10x b/l 1/2 foam 10x b/l 1/2 foam        Ham s 3x20s 3x20s  3x30s 3x30" b/l Supine 5x10" b/l        SKTC             Piriformis s 3x20s 3x20's  3x30s [id number]" b/l Seated 3x30'' b/l        Prone ham curl 2# 3x10 2# 3x10  NV          RB  5 min  5 min 5 min 5' lvl 1                                  Ther Activity             Heel raises Off step 3x10 Off step   3x10 NV Off step 3x10 Off step 3x10                     Gait Training                                       Modalities

## 2024-02-21 PROBLEM — A08.4 VIRAL GASTROENTERITIS: Status: RESOLVED | Noted: 2018-02-27 | Resolved: 2024-02-21

## 2024-02-21 PROBLEM — Z13.828 SCOLIOSIS CONCERN: Status: RESOLVED | Noted: 2018-09-11 | Resolved: 2024-02-21

## 2024-04-19 ENCOUNTER — OFFICE VISIT (OUTPATIENT)
Dept: FAMILY MEDICINE CLINIC | Facility: CLINIC | Age: 19
End: 2024-04-19
Payer: COMMERCIAL

## 2024-04-19 VITALS
BODY MASS INDEX: 20.56 KG/M2 | TEMPERATURE: 96.6 F | OXYGEN SATURATION: 99 % | RESPIRATION RATE: 16 BRPM | HEART RATE: 68 BPM | HEIGHT: 69 IN | SYSTOLIC BLOOD PRESSURE: 96 MMHG | DIASTOLIC BLOOD PRESSURE: 66 MMHG | WEIGHT: 138.8 LBS

## 2024-04-19 DIAGNOSIS — Z13.220 ENCOUNTER FOR LIPID SCREENING FOR CARDIOVASCULAR DISEASE: ICD-10-CM

## 2024-04-19 DIAGNOSIS — J30.1 SEASONAL ALLERGIC RHINITIS DUE TO POLLEN: Primary | ICD-10-CM

## 2024-04-19 DIAGNOSIS — Z13.6 ENCOUNTER FOR LIPID SCREENING FOR CARDIOVASCULAR DISEASE: ICD-10-CM

## 2024-04-19 DIAGNOSIS — Z13.1 SCREENING FOR DIABETES MELLITUS (DM): ICD-10-CM

## 2024-04-19 PROBLEM — H61.22 IMPACTED CERUMEN OF LEFT EAR: Status: ACTIVE | Noted: 2024-04-19

## 2024-04-19 PROCEDURE — 99213 OFFICE O/P EST LOW 20 MIN: CPT | Performed by: FAMILY MEDICINE

## 2024-04-19 NOTE — PROGRESS NOTES
Name: Vito Ramos      : 2005      MRN: [id number]  Encounter Provider: Selvin Griffiths DO  Encounter Date: 2024   Encounter department: [facility]    Assessment & Plan     1. Seasonal allergic rhinitis due to pollen  Assessment & Plan:  - Presents for ear fullness, some postnasal drip.  -Denies fevers, chills, nausea, vomiting.  Denies significant coughing or concern about mucus production.  -Has not been using nasal sprays or antihistamines as of yet.  -No impaction noted on exam.  -Discussed likely allergic rhinitis.  -Start Flonase 1 spray each nostril once daily  -Can add azelastine spray twice daily  -Start Zyrtec daily.  Follow-up as needed      2. Encounter for lipid screening for cardiovascular disease  -     Lipid Panel With Direct LDL; Future    3. Screening for diabetes mellitus (DM)  -     Comprehensive metabolic panel; Future           Subjective     Vito is a 19-year-old male who presents today for evaluation of ear fullness.  Also notes some mild postnasal drip.  Does admit that he has had wax impaction in the past and was concerned about this.  Denies fevers, chills, nausea, vomiting, diarrhea.  Denies sinus pressure or headaches.  Does admit to seasonal allergies on occasion and is not taking anything for those at this time.  Denies any recent sick contacts or recent travel.      Review of Systems   Constitutional:  Negative for chills and fever.   HENT:  Positive for congestion and postnasal drip. Negative for ear pain and sore throat.         Ear fullness   Eyes:  Negative for pain and visual disturbance.   Respiratory:  Negative for cough and shortness of breath.    Cardiovascular:  Negative for chest pain and palpitations.   Gastrointestinal:  Negative for abdominal pain and vomiting.   Genitourinary:  Negative for dysuria and hematuria.   Musculoskeletal:  Negative for arthralgias and back pain.   Skin:  Negative for color change and rash.   Neurological:   Negative for seizures and syncope.   Psychiatric/Behavioral:  Negative for confusion and sleep disturbance. The patient is not nervous/anxious.    All other systems reviewed and are negative.      Past Medical History:   Diagnosis Date    Asthma     Candidiasis, cutaneous     last assessed 7/13/2015    Cerebral palsy (HCC)     Hematuria     last assessed 8/17/2012    Hordeolum externum     last assessed 10/22/2012     Past Surgical History:   Procedure Laterality Date    HYPOSPADIAS CORRECTION      one stage proximal penile hypospadias repair    TONSILLECTOMY AND ADENOIDECTOMY       Family History   Problem Relation Age of Onset    Hypertension Father      Social History     Socioeconomic History    Marital status: Single     Spouse name: None    Number of children: None    Years of education: None    Highest education level: None   Occupational History    None   Tobacco Use    Smoking status: Never    Smokeless tobacco: Never   Vaping Use    Vaping status: Never Used   Substance and Sexual Activity    Alcohol use: No    Drug use: No    Sexual activity: None   Other Topics Concern    None   Social History Narrative    None     Social Determinants of Health     Financial Resource Strain: Not on file   Food Insecurity: Not on file   Transportation Needs: Not on file   Physical Activity: Not on file   Stress: Not on file   Social Connections: Not on file   Intimate Partner Violence: Not on file   Housing Stability: Not on file     Current Outpatient Medications on File Prior to Visit   Medication Sig    benzonatate (TESSALON PERLES) 100 mg capsule Take 1 capsule (100 mg total) by mouth 3 (three) times a day as needed for cough (Patient not taking: Reported on 1/25/2023)    fluticasone (FLONASE) 50 mcg/act nasal spray 1 spray into each nostril daily (Patient not taking: Reported on 1/25/2023)     Allergies   Allergen Reactions    Pollen Extract      Immunization History   Administered Date(s) Administered    COVID-19  "PFIZER VACCINE 0.3 ML IM 06/10/2021, 07/14/2021    DTaP 2003, 2003, 2003, 09/09/2004, 06/05/2007    DTaP 5 2003, 2003, 2003, 09/09/2004, 06/05/2007    H1N1, All Formulations 11/05/2009, 01/04/2010    HPV Quadrivalent 10/13/2016    HPV9 08/10/2016, 02/13/2017    Hep A, adult 03/06/2009, 09/11/2009    Hep B / HiB 2003, 2003, 06/21/2004    Hep B, adult 2003, 2003, 06/21/2004    Hepatitis A 03/06/2009, 09/11/2009    Hib (PRP-OMP) 2003, 2003, 06/21/2004    INFLUENZA 11/27/2007, 01/10/2008, 11/17/2010, 10/03/2012, 10/17/2017, 11/05/2018    IPV 2003, 2003, 09/09/2004, 06/05/2007    Influenza Quadrivalent Preservative Free 3 years and older IM 09/22/2014, 10/01/2016, 10/17/2017    Influenza Quadrivalent, 6-35 Months IM 09/24/2015, 10/17/2017    Influenza, injectable, quadrivalent, preservative free 0.5 mL 11/05/2018, 10/30/2019, 09/27/2021, 01/25/2023    Influenza, seasonal, injectable 11/27/2007, 01/10/2008, 11/17/2010, 10/03/2012, 10/03/2012, 09/27/2013    MMR 06/21/2004, 06/05/2007    Meningococcal Conjugate (MCV4O) 07/11/2014    Meningococcal MCV4P 04/02/2019    Pneumococcal Conjugate PCV 7 2003, 2003, 2003    Tdap 07/11/2014    Varicella 02/23/2004, 10/08/2007       Objective     BP 96/66 (BP Location: Left arm, Patient Position: Sitting, Cuff Size: Standard)   Pulse 68   Temp (!) 96.6 °F (35.9 °C) (Tympanic)   Resp 16   Ht 5' 8.9\" (1.75 m)   Wt 63 kg (138 lb 12.8 oz)   SpO2 99%   BMI 20.56 kg/m²     Physical Exam  Vitals and nursing note reviewed.   Constitutional:       General: He is not in acute distress.     Appearance: Normal appearance. He is not ill-appearing.   HENT:      Head: Normocephalic and atraumatic.      Right Ear: Tympanic membrane and external ear normal.      Left Ear: Tympanic membrane and external ear normal.      Nose: Rhinorrhea present. No congestion.      Mouth/Throat:      Mouth: " Mucous membranes are moist.      Pharynx: No oropharyngeal exudate.      Comments: Cobblestoning present  Eyes:      Extraocular Movements: Extraocular movements intact.      Conjunctiva/sclera: Conjunctivae normal.      Pupils: Pupils are equal, round, and reactive to light.   Cardiovascular:      Rate and Rhythm: Normal rate and regular rhythm.      Pulses: Normal pulses.      Heart sounds: Normal heart sounds. No murmur heard.  Pulmonary:      Effort: Pulmonary effort is normal.      Breath sounds: Normal breath sounds. No wheezing, rhonchi or rales.   Abdominal:      General: Bowel sounds are normal.      Palpations: Abdomen is soft.      Tenderness: There is no abdominal tenderness. There is no guarding or rebound.   Musculoskeletal:         General: Normal range of motion.      Cervical back: Normal range of motion.      Right lower leg: No edema.      Left lower leg: No edema.   Lymphadenopathy:      Cervical: No cervical adenopathy.   Skin:     General: Skin is warm.      Capillary Refill: Capillary refill takes less than 2 seconds.      Findings: No erythema.   Neurological:      General: No focal deficit present.      Mental Status: He is alert and oriented to person, place, and time.      Cranial Nerves: No cranial nerve deficit.      Motor: No weakness.   Psychiatric:         Mood and Affect: Mood normal.         Behavior: Behavior normal.       Selvin Griffiths, DO

## 2024-04-29 NOTE — ASSESSMENT & PLAN NOTE
- Presents for ear fullness, some postnasal drip.  -Denies fevers, chills, nausea, vomiting.  Denies significant coughing or concern about mucus production.  -Has not been using nasal sprays or antihistamines as of yet.  -No impaction noted on exam.  -Discussed likely allergic rhinitis.  -Start Flonase 1 spray each nostril once daily  -Can add azelastine spray twice daily  -Start Zyrtec daily.  Follow-up as needed

## 2024-05-19 PROBLEM — H61.22 IMPACTED CERUMEN OF LEFT EAR: Status: RESOLVED | Noted: 2024-04-19 | Resolved: 2024-05-19

## 2024-09-05 ENCOUNTER — OFFICE VISIT (OUTPATIENT)
Dept: FAMILY MEDICINE CLINIC | Facility: CLINIC | Age: 19
End: 2024-09-05
Payer: COMMERCIAL

## 2024-09-05 VITALS
HEART RATE: 66 BPM | DIASTOLIC BLOOD PRESSURE: 60 MMHG | RESPIRATION RATE: 16 BRPM | SYSTOLIC BLOOD PRESSURE: 96 MMHG | BODY MASS INDEX: 20.31 KG/M2 | OXYGEN SATURATION: 99 % | TEMPERATURE: 96.7 F | HEIGHT: 68 IN | WEIGHT: 134 LBS

## 2024-09-05 DIAGNOSIS — G80.8 OTHER CEREBRAL PALSY (HCC): ICD-10-CM

## 2024-09-05 DIAGNOSIS — Z23 ENCOUNTER FOR IMMUNIZATION: ICD-10-CM

## 2024-09-05 DIAGNOSIS — Z00.00 ANNUAL PHYSICAL EXAM: Primary | ICD-10-CM

## 2024-09-05 PROCEDURE — 90656 IIV3 VACC NO PRSV 0.5 ML IM: CPT

## 2024-09-05 PROCEDURE — 90471 IMMUNIZATION ADMIN: CPT

## 2024-09-05 PROCEDURE — 99395 PREV VISIT EST AGE 18-39: CPT | Performed by: FAMILY MEDICINE

## 2024-09-05 NOTE — PROGRESS NOTES
Adult Annual Physical  Name: Vito Ramos      : 2005      MRN: [id number]  Encounter Provider: Selvin Griffiths DO  Encounter Date: 2024   Encounter department: [facility]    Assessment & Plan   1. Annual physical exam  Comments:  Vito is doing well today.  No concerns.  Discussed continuing working on healthy diet and reviewed healthy exercise recommendations.  2. Encounter for immunization  -     influenza vaccine preservative-free 0.5 mL IM (Fluzone, Afluria, Fluarix, Flulaval)  3. Other cerebral palsy (HCC)  Assessment & Plan:  - Feeling like restarting PT did help with muscle tightness and balance. No issues at this time.   Immunizations and preventive care screenings were discussed with patient today. Appropriate education was printed on patient's after visit summary.    Counseling:  Alcohol/drug use: discussed moderation in alcohol intake, the recommendations for healthy alcohol use, and avoidance of illicit drug use.  Dental Health: discussed importance of regular tooth brushing, flossing, and dental visits.  Injury prevention: discussed safety/seat belts, safety helmets, smoke detectors, carbon dioxide detectors, and smoking near bedding or upholstery.  Sexual health: discussed sexually transmitted diseases, partner selection, use of condoms, avoidance of unintended pregnancy, and contraceptive alternatives.  Exercise: the importance of regular exercise/physical activity was discussed. Recommend exercise 3-5 times per week for at least 30 minutes.          History of Present Illness     Adult Annual Physical  Review of Systems   Constitutional:  Negative for chills and fever.   HENT:  Negative for ear pain and sore throat.    Eyes:  Negative for pain and visual disturbance.   Respiratory:  Negative for cough and shortness of breath.    Cardiovascular:  Negative for chest pain and palpitations.   Gastrointestinal:  Negative for abdominal pain and vomiting.   Genitourinary:   "Negative for dysuria and hematuria.   Musculoskeletal:  Negative for arthralgias and back pain.   Skin:  Negative for color change and rash.   Neurological:  Negative for seizures and syncope.   Psychiatric/Behavioral:  Negative for confusion and sleep disturbance. The patient is not nervous/anxious.    All other systems reviewed and are negative.        Objective     BP 96/60 (BP Location: Left arm, Patient Position: Sitting, Cuff Size: Standard)   Pulse 66   Temp (!) 96.7 °F (35.9 °C) (Tympanic)   Resp 16   Ht 5' 8.43\" (1.738 m)   Wt 60.8 kg (134 lb)   SpO2 99%   BMI 20.12 kg/m²     Physical Exam  Vitals and nursing note reviewed.   Constitutional:       General: He is not in acute distress.     Appearance: Normal appearance.   HENT:      Head: Normocephalic and atraumatic.      Right Ear: Tympanic membrane and external ear normal.      Left Ear: Tympanic membrane and external ear normal.      Nose: Nose normal.      Mouth/Throat:      Mouth: Mucous membranes are moist.   Eyes:      Extraocular Movements: Extraocular movements intact.      Conjunctiva/sclera: Conjunctivae normal.      Pupils: Pupils are equal, round, and reactive to light.   Cardiovascular:      Rate and Rhythm: Normal rate and regular rhythm.      Pulses: Normal pulses.      Heart sounds: Normal heart sounds. No murmur heard.  Pulmonary:      Effort: Pulmonary effort is normal.      Breath sounds: Normal breath sounds. No wheezing, rhonchi or rales.   Abdominal:      General: Bowel sounds are normal.      Palpations: Abdomen is soft.      Tenderness: There is no abdominal tenderness. There is no guarding.   Musculoskeletal:         General: Normal range of motion.      Cervical back: Normal range of motion.      Right lower leg: No edema.      Left lower leg: No edema.   Lymphadenopathy:      Cervical: No cervical adenopathy.   Skin:     General: Skin is warm.      Capillary Refill: Capillary refill takes less than 2 seconds. "   Neurological:      General: No focal deficit present.      Mental Status: He is alert and oriented to person, place, and time.   Psychiatric:         Mood and Affect: Mood normal.         Behavior: Behavior normal.

## 2024-09-05 NOTE — PATIENT INSTRUCTIONS
"Patient Education     Routine physical for adults   The Basics   Written by the doctors and editors at [facility]   What is a physical? -- A physical is a routine visit, or \"check-up,\" with your doctor. You might also hear it called a \"wellness visit\" or \"preventive visit.\"  During each visit, the doctor will:   Ask about your physical and mental health   Ask about your habits, behaviors, and lifestyle   Do an exam   Give you vaccines if needed   Talk to you about any medicines you take   Give advice about your health   Answer your questions  Getting regular check-ups is an important part of taking care of your health. It can help your doctor find and treat any problems you have. But it's also important for preventing health problems.  A routine physical is different from a \"sick visit.\" A sick visit is when you see a doctor because of a health concern or problem. Since physicals are scheduled ahead of time, you can think about what you want to ask the doctor.  How often should I get a physical? -- It depends on your age and health. In general, for people age 21 years and older:   If you are younger than 50 years, you might be able to get a physical every 3 years.   If you are 50 years or older, your doctor might recommend a physical every year.  If you have an ongoing health condition, like diabetes or high blood pressure, your doctor will probably want to see you more often.  What happens during a physical? -- In general, each visit will include:   Physical exam - The doctor or nurse will check your height, weight, heart rate, and blood pressure. They will also look at your eyes and ears. They will ask about how you are feeling and whether you have any symptoms that bother you.   Medicines - It's a good idea to bring a list of all the medicines you take to each doctor visit. Your doctor will talk to you about your medicines and answer any questions. Tell them if you are having any side effects that bother you. You " "should also tell them if you are having trouble paying for any of your medicines.   Habits and behaviors - This includes:   Your diet   Your exercise habits   Whether you smoke, drink alcohol, or use drugs   Whether you are sexually active   Whether you feel safe at home  Your doctor will talk to you about things you can do to improve your health and lower your risk of health problems. They will also offer help and support. For example, if you want to quit smoking, they can give you advice and might prescribe medicines. If you want to improve your diet or get more physical activity, they can help you with this, too.   Lab tests, if needed - The tests you get will depend on your age and situation. For example, your doctor might want to check your:   Cholesterol   Blood sugar   Iron level   Vaccines - The recommended vaccines will depend on your age, health, and what vaccines you already had. Vaccines are very important because they can prevent certain serious or deadly infections.   Discussion of screening - \"Screening\" means checking for diseases or other health problems before they cause symptoms. Your doctor can recommend screening based on your age, risk, and preferences. This might include tests to check for:   Cancer, such as breast, prostate, cervical, ovarian, colorectal, prostate, lung, or skin cancer   Sexually transmitted infections, such as chlamydia and gonorrhea   Mental health conditions like depression and anxiety  Your doctor will talk to you about the different types of screening tests. They can help you decide which screenings to have. They can also explain what the results might mean.   Answering questions - The physical is a good time to ask the doctor or nurse questions about your health. If needed, they can refer you to other doctors or specialists, too.  Adults older than 65 years often need other care, too. As you get older, your doctor will talk to you about:   How to prevent falling at " home   Hearing or vision tests   Memory testing   How to take your medicines safely   Making sure that you have the help and support you need at home  All topics are updated as new evidence becomes available and our peer review process is complete.  This topic retrieved from [employer] on: May 02, 2024.  Topic 391032 Version 1.0  Release: 32.4.3 - C32.122  © 2024 UpToDate, Inc. and/or its affiliates. All rights reserved.  Consumer Information Use and Disclaimer   Disclaimer: This generalized information is a limited summary of diagnosis, treatment, and/or medication information. It is not meant to be comprehensive and should be used as a tool to help the user understand and/or assess potential diagnostic and treatment options. It does NOT include all information about conditions, treatments, medications, side effects, or risks that may apply to a specific patient. It is not intended to be medical advice or a substitute for the medical advice, diagnosis, or treatment of a health care provider based on the health care provider's examination and assessment of a patient's specific and unique circumstances. Patients must speak with a health care provider for complete information about their health, medical questions, and treatment options, including any risks or benefits regarding use of medications. This information does not endorse any treatments or medications as safe, effective, or approved for treating a specific patient. UpToDate, Inc. and its affiliates disclaim any warranty or liability relating to this information or the use thereof.The use of this information is governed by the Terms of Use, available at https://www.woltersC-samuwer.com/en/know/clinical-effectiveness-terms. 2024© UpToDate, Inc. and its affiliates and/or licensors. All rights reserved.  Copyright   © 2024 UpToDate, Inc. and/or its affiliates. All rights reserved.

## 2024-12-30 ENCOUNTER — HOSPITAL ENCOUNTER (EMERGENCY)
Facility: HOSPITAL | Age: 19
Discharge: HOME | End: 2024-12-30
Attending: EMERGENCY MEDICINE
Payer: COMMERCIAL

## 2024-12-30 ENCOUNTER — APPOINTMENT (EMERGENCY)
Dept: RADIOLOGY | Facility: HOSPITAL | Age: 19
End: 2024-12-30
Payer: COMMERCIAL

## 2024-12-30 VITALS
RESPIRATION RATE: 18 BRPM | HEIGHT: 68 IN | BODY MASS INDEX: 20.46 KG/M2 | SYSTOLIC BLOOD PRESSURE: 123 MMHG | TEMPERATURE: 100.2 F | HEART RATE: 111 BPM | DIASTOLIC BLOOD PRESSURE: 59 MMHG | OXYGEN SATURATION: 99 % | WEIGHT: 135 LBS

## 2024-12-30 DIAGNOSIS — J06.9 URI (UPPER RESPIRATORY INFECTION): Primary | ICD-10-CM

## 2024-12-30 PROCEDURE — 71046 X-RAY EXAM CHEST 2 VIEWS: CPT

## 2024-12-30 PROCEDURE — 99283 EMERGENCY DEPT VISIT LOW MDM: CPT

## 2024-12-30 PROCEDURE — 99284 EMERGENCY DEPT VISIT MOD MDM: CPT | Performed by: EMERGENCY MEDICINE

## 2024-12-30 RX ORDER — AZELASTINE 1 MG/ML
1 SPRAY, METERED NASAL 2 TIMES DAILY
Qty: 1 ML | Refills: 0 | Status: SHIPPED | OUTPATIENT
Start: 2024-12-30

## 2024-12-30 NOTE — DISCHARGE INSTRUCTIONS
Use the azelastine spray 2 sprays in each nostril twice daily as needed for nasal congestion.  Use the Mucinex DM twice daily as needed for chest congestion.    Follow-up with your primary care provider.  Come back for new or worsening symptoms including but not limited to shortness of breath.    I have read your x-ray while you are here.  Radiology will read the x-ray in the next day.  If they note anything different you will be called immediately.

## 2024-12-30 NOTE — Clinical Note
Vito Ramos was seen and treated in our emergency department on 12/30/2024.                Diagnosis: Flu like symptoms    Vito  .    He may return on this date: 12/31/2024         If you have any questions or concerns, please don't hesitate to call.      Deyvi Pappas, DO    ______________________________           _______________          _______________  Hospital Representative                              Date                                Time

## 2024-12-30 NOTE — ED PROVIDER NOTES
Time reflects when diagnosis was documented in both MDM as applicable and the Disposition within this note       Time User Action Codes Description Comment    12/30/2024  1:02 PM Deyvi Pappas Add [J06.9] URI (upper respiratory infection)           ED Disposition       ED Disposition   Discharge    Condition   Stable    Date/Time   Mon Dec 30, 2024  1:31 PM    Comment   Vito Stump discharge to home/self care.                   Assessment & Plan       Medical Decision Making  This patient was flulike symptoms.  1 week of symptoms.    Differential including but not limited to viral illness, flulike symptoms.  Doubt pneumonia but will obtain chest x-ray.  Appears well-hydrated on exam.  No reason for blood work at this point.    Problems Addressed:  URI (upper respiratory infection): acute illness or injury    Amount and/or Complexity of Data Reviewed  Radiology: ordered and independent interpretation performed.    Risk  OTC drugs.  Prescription drug management.             Medications - No data to display    ED Risk Strat Scores                          SBIRT 20yo+      Flowsheet Row Most Recent Value   Initial Alcohol Screen: US AUDIT-C     1. How often do you have a drink containing alcohol? 0 Filed at: 12/30/2024 1241   Audit-C Score 0 Filed at: 12/30/2024 1241   DK: How many times in the past year have you...    Used an illegal drug or used a prescription medication for non-medical reasons? Never Filed at: 12/30/2024 1241                            History of Present Illness       Chief Complaint   Patient presents with    URI     Pt reports intermittent URI symptoms of cough, congestion, nasal congestion for approx 1 week        Past Medical History:   Diagnosis Date    Asthma     Candidiasis, cutaneous     last assessed 7/13/2015    Cerebral palsy (HCC)     Hematuria     last assessed 8/17/2012    Hordeolum externum     last assessed 10/22/2012      Past Surgical History:   Procedure Laterality Date     HYPOSPADIAS CORRECTION      one stage proximal penile hypospadias repair    TONSILLECTOMY AND ADENOIDECTOMY        Family History   Problem Relation Age of Onset    Hypertension Father       Social History     Tobacco Use    Smoking status: Never    Smokeless tobacco: Never   Vaping Use    Vaping status: Never Used   Substance Use Topics    Alcohol use: No    Drug use: No      E-Cigarette/Vaping    E-Cigarette Use Never User       E-Cigarette/Vaping Substances    Nicotine No     THC No     CBD No     Flavoring No     Other No     Unknown No       I have reviewed and agree with the history as documented.     19-year-old male presenting the emergency department for URI-like symptoms for 1 week.  Fever, cough, myalgias, fatigue, headache.  1 week of symptoms.  No shortness of breath.  Has taken nothing for the symptoms beyond Tylenol which she took a few days ago for fever.      URI  Presenting symptoms: congestion, cough, fatigue and fever    Associated symptoms: myalgias and sneezing        Review of Systems   Constitutional:  Positive for fatigue and fever.   HENT:  Positive for congestion and sneezing.    Respiratory:  Positive for cough.    Musculoskeletal:  Positive for myalgias.   All other systems reviewed and are negative.          Objective       ED Triage Vitals [12/30/24 1239]   Temperature Pulse Blood Pressure Respirations SpO2 Patient Position - Orthostatic VS   100.2 °F (37.9 °C) (!) 111 123/59 18 99 % Sitting      Temp Source Heart Rate Source BP Location FiO2 (%) Pain Score    Oral Monitor Left arm -- No Pain      Vitals      Date and Time Temp Pulse SpO2 Resp BP Pain Score FACES Pain Rating User   12/30/24 1239 100.2 °F (37.9 °C) 111 99 % 18 123/59 No Pain -- KLB            Physical Exam  Vitals and nursing note reviewed.   Constitutional:       General: He is not in acute distress.     Appearance: He is well-developed. He is not diaphoretic.   HENT:      Head: Normocephalic and atraumatic.      Right  Ear: External ear normal.      Left Ear: External ear normal.   Eyes:      Conjunctiva/sclera: Conjunctivae normal.   Neck:      Trachea: No tracheal deviation.   Cardiovascular:      Rate and Rhythm: Normal rate and regular rhythm.      Heart sounds: Normal heart sounds. No murmur heard.  Pulmonary:      Effort: No respiratory distress.      Breath sounds: Normal breath sounds. No stridor. No wheezing or rales.   Abdominal:      General: Bowel sounds are normal. There is no distension.      Palpations: Abdomen is soft. There is no mass.      Tenderness: There is no abdominal tenderness. There is no guarding or rebound.   Musculoskeletal:         General: No tenderness or deformity.      Cervical back: No rigidity.   Skin:     General: Skin is dry.      Findings: No rash.   Neurological:      Motor: No abnormal muscle tone.      Coordination: Coordination normal.   Psychiatric:         Behavior: Behavior normal.         Thought Content: Thought content normal.         Judgment: Judgment normal.         Results Reviewed       None            XR chest 2 views   ED Interpretation by Deyvi Pappas DO ([id number])   No acute cardiopulmonary finding.          Procedures    ED Medication and Procedure Management   Prior to Admission Medications   Prescriptions Last Dose Informant Patient Reported? Taking?   benzonatate (TESSALON PERLES) 100 mg capsule   No No   Sig: Take 1 capsule (100 mg total) by mouth 3 (three) times a day as needed for cough   Patient not taking: Reported on 2023   fluticasone (FLONASE) 50 mcg/act nasal spray   No No   Si spray into each nostril daily   Patient not taking: Reported on 2024      Facility-Administered Medications: None     Patient's Medications   Discharge Prescriptions    AZELASTINE (ASTELIN) 0.1 % NASAL SPRAY    1 spray into each nostril 2 (two) times a day Use in each nostril as directed       Start Date: 2024End Date: --       Order Dose: 1 spray        Quantity: 1 mL    Refills: 0    DEXTROMETHORPHAN-GUAIFENESIN (MUCINEX DM)  MG PER 12 HR TABLET    Take 1 tablet by mouth every 12 (twelve) hours       Start Date: 12/30/2024End Date: --       Order Dose: 1 tablet       Quantity: 60 tablet    Refills: 0     No discharge procedures on file.  ED SEPSIS DOCUMENTATION   Time reflects when diagnosis was documented in both MDM as applicable and the Disposition within this note       Time User Action Codes Description Comment    12/30/2024  1:02 PM Deyvi Pappas Add [J06.9] URI (upper respiratory infection)                  Deyvi Pappas, DO  12/30/24 8817

## 2025-01-02 ENCOUNTER — OFFICE VISIT (OUTPATIENT)
Dept: FAMILY MEDICINE CLINIC | Facility: CLINIC | Age: 20
End: 2025-01-02
Payer: COMMERCIAL

## 2025-01-02 VITALS
HEIGHT: 68 IN | TEMPERATURE: 99.3 F | HEART RATE: 83 BPM | OXYGEN SATURATION: 98 % | BODY MASS INDEX: 20.31 KG/M2 | DIASTOLIC BLOOD PRESSURE: 62 MMHG | RESPIRATION RATE: 16 BRPM | SYSTOLIC BLOOD PRESSURE: 96 MMHG | WEIGHT: 134 LBS

## 2025-01-02 DIAGNOSIS — B34.9 ACUTE VIRAL SYNDROME: ICD-10-CM

## 2025-01-02 PROCEDURE — 99213 OFFICE O/P EST LOW 20 MIN: CPT | Performed by: NURSE PRACTITIONER

## 2025-01-02 RX ORDER — BENZONATATE 100 MG/1
100 CAPSULE ORAL 3 TIMES DAILY PRN
Qty: 20 CAPSULE | Refills: 0 | Status: SHIPPED | OUTPATIENT
Start: 2025-01-02

## 2025-01-02 NOTE — ASSESSMENT & PLAN NOTE
Lungs are clear, he is afebrile with no sob.  Symptoms most suggestive of resolving viral respiratory infection and reassured pt that it is common for cough to persist for 2-3 weeks after infection.  Will obtain chest x-ray to ensure no pneumonia.  Reinforce hydration, refilled benzonatate to use prn.  Pt instructed to call for reevaluation if sx worsen or persist.    Orders:    benzonatate (TESSALON PERLES) 100 mg capsule; Take 1 capsule (100 mg total) by mouth 3 (three) times a day as needed for cough    XR chest pa and lateral; Future

## 2025-01-02 NOTE — PROGRESS NOTES
Name: Vito Ramos      : 2005      MRN: [id number]  Encounter Provider: SHEYLA Lee  Encounter Date: 2025   Encounter department: [facility]  :  Assessment & Plan  Acute viral syndrome  Lungs are clear, he is afebrile with no sob.  Symptoms most suggestive of resolving viral respiratory infection and reassured pt that it is common for cough to persist for 2-3 weeks after infection.  Will obtain chest x-ray to ensure no pneumonia.  Reinforce hydration, refilled benzonatate to use prn.  Pt instructed to call for reevaluation if sx worsen or persist.    Orders:    benzonatate (TESSALON PERLES) 100 mg capsule; Take 1 capsule (100 mg total) by mouth 3 (three) times a day as needed for cough    XR chest pa and lateral; Future           History of Present Illness     Pt is a 19 y.o. male who is seen today for evaluation of cough.  Seen  in [facility] ER.  He presented with 1 week history of fever, cough, body aches, fatigue, headache, cough.  Normal chest x-ray.  He was given prescription for guaifenesinDM and azelastine.  He says his cough is still present, it seems to wax and wane over the last few days.  His cough is non-productive but it feels like it wants to bring up sputum.  No sob.  Appetite is diminished, denies n/v/d.  He denies fatigue/body aches.  He has been taking the guaifenesinDM but does not feel like this is helping much.      Review of Systems   Constitutional:  Positive for appetite change. Negative for chills, fatigue and fever.   HENT:  Positive for congestion. Negative for hearing loss, sinus pressure and sore throat.    Respiratory:  Positive for cough. Negative for chest tightness, shortness of breath and wheezing.    Gastrointestinal:  Negative for diarrhea, nausea and vomiting.   Musculoskeletal:  Negative for myalgias.   Neurological:  Negative for dizziness, light-headedness and headaches.   Hematological:  Negative for adenopathy.  "      Objective   BP 96/62 (BP Location: Left arm, Patient Position: Sitting, Cuff Size: Standard)   Pulse 83   Temp 99.3 °F (37.4 °C) (Tympanic)   Resp 16   Ht 5' 8\" (1.727 m)   Wt 60.8 kg (134 lb)   SpO2 98%   BMI 20.37 kg/m²      Physical Exam  Vitals reviewed.   Constitutional:       General: He is awake. He is not in acute distress.     Appearance: Normal appearance. He is well-developed and well-groomed. He is not ill-appearing.   HENT:      Right Ear: Hearing, tympanic membrane, ear canal and external ear normal. No middle ear effusion.      Left Ear: Hearing and external ear normal.  No middle ear effusion. There is impacted cerumen.      Nose: Nose normal. No mucosal edema.      Mouth/Throat:      Lips: Pink.      Mouth: Mucous membranes are moist. Mucous membranes are not dry.      Pharynx: No oropharyngeal exudate or posterior oropharyngeal erythema.      Tonsils: No tonsillar abscesses.   Eyes:      Conjunctiva/sclera: Conjunctivae normal.   Cardiovascular:      Rate and Rhythm: Normal rate and regular rhythm.      Heart sounds: Normal heart sounds.   Pulmonary:      Effort: Pulmonary effort is normal.      Breath sounds: Normal breath sounds.   Lymphadenopathy:      Head:      Right side of head: No submental, submandibular, tonsillar, preauricular, posterior auricular or occipital adenopathy.      Left side of head: No submental, submandibular, tonsillar, preauricular, posterior auricular or occipital adenopathy.      Cervical: No cervical adenopathy.   Skin:     General: Skin is warm and dry.   Neurological:      Mental Status: He is alert and oriented to person, place, and time.   Psychiatric:         Attention and Perception: Attention normal.         Mood and Affect: Mood normal.         Speech: Speech normal.         Behavior: Behavior normal. Behavior is cooperative.         Thought Content: Thought content normal.         Judgment: Judgment normal.         "

## 2025-08-20 ENCOUNTER — OFFICE VISIT (OUTPATIENT)
Dept: FAMILY MEDICINE CLINIC | Facility: CLINIC | Age: 20
End: 2025-08-20
Payer: COMMERCIAL

## 2025-08-20 VITALS
WEIGHT: 138.6 LBS | HEART RATE: 117 BPM | RESPIRATION RATE: 20 BRPM | HEIGHT: 68 IN | TEMPERATURE: 99.3 F | OXYGEN SATURATION: 99 % | SYSTOLIC BLOOD PRESSURE: 104 MMHG | DIASTOLIC BLOOD PRESSURE: 70 MMHG | BODY MASS INDEX: 21.01 KG/M2

## 2025-08-20 DIAGNOSIS — B34.9 ACUTE VIRAL SYNDROME: Primary | ICD-10-CM

## 2025-08-20 DIAGNOSIS — H61.21 RIGHT EAR IMPACTED CERUMEN: ICD-10-CM

## 2025-08-20 PROCEDURE — 69209 REMOVE IMPACTED EAR WAX UNI: CPT | Performed by: FAMILY MEDICINE

## 2025-08-20 PROCEDURE — 99213 OFFICE O/P EST LOW 20 MIN: CPT | Performed by: FAMILY MEDICINE

## 2025-08-20 RX ORDER — FLUTICASONE PROPIONATE 50 MCG
1 SPRAY, SUSPENSION (ML) NASAL DAILY
Qty: 9.9 ML | Refills: 0 | Status: SHIPPED | OUTPATIENT
Start: 2025-08-20

## 2025-08-21 ENCOUNTER — TELEPHONE (OUTPATIENT)
Age: 20
End: 2025-08-21

## 2025-08-21 ENCOUNTER — RESULTS FOLLOW-UP (OUTPATIENT)
Dept: FAMILY MEDICINE CLINIC | Facility: CLINIC | Age: 20
End: 2025-08-21

## 2025-08-21 LAB — SARS-COV-2 RNA RESP QL NAA+PROBE: POSITIVE
